# Patient Record
Sex: FEMALE
[De-identification: names, ages, dates, MRNs, and addresses within clinical notes are randomized per-mention and may not be internally consistent; named-entity substitution may affect disease eponyms.]

---

## 2020-04-27 ENCOUNTER — HOSPITAL ENCOUNTER (OUTPATIENT)
Dept: HOSPITAL 92 - L&D/OP | Age: 30
Discharge: HOME HEALTH | End: 2020-04-27
Attending: FAMILY MEDICINE
Payer: SELF-PAY

## 2020-04-27 VITALS — BODY MASS INDEX: 29.4 KG/M2

## 2020-04-27 DIAGNOSIS — N89.8: ICD-10-CM

## 2020-04-27 DIAGNOSIS — Z3A.38: ICD-10-CM

## 2020-04-27 DIAGNOSIS — O99.89: Primary | ICD-10-CM

## 2020-04-27 DIAGNOSIS — O24.415: ICD-10-CM

## 2020-04-27 DIAGNOSIS — Z79.84: ICD-10-CM

## 2020-04-27 LAB — AMNISURE INTERNAL CONTROL QC: (no result)

## 2020-04-27 PROCEDURE — 99284 EMERGENCY DEPT VISIT MOD MDM: CPT

## 2020-04-27 PROCEDURE — 84112 EVAL AMNIOTIC FLUID PROTEIN: CPT

## 2020-04-27 NOTE — PDOC.FPROB
FMR OB H&P: HPI





- History of Present Illness


Chief Complaint: LOF


Indentification: 28yo  @ 38wga


History of Present Illness: 





Patient is a 29F  @ 38wga with PMHx of A2GDM that presents due to possible 

LOF. 





Patient reports that around 8pm last night she began to feel fluid down her 

pants when she sat up from the chair. She reports it continued at night and 

this morning so she came in. She states the fluid was clear. She denies any 

vaginal bleeding, other vaginal discharge, cp, sob, n/v/d. She states she is 

feeling a contraction every 30min though mild. +FM. Patient reports sexual 

intercourse within the last 48hrs. 





PCP: E[facility]


Primary Care Physician: 





PCP: Ewa





FMR OB H&P: Current Pregnancy





- Prenatal Care


: 2


Para: 1


Gestational age: 38wga





- OB Labs


Blood type: O


RH: positive


Antibody Screen: negative


HIV: negative


RPR: negative


Rubella: immune


Gonorrhea: negative


Chlamydia: negative


1 hour gtt: 190


3 hour GTT: 177


A1c: 5.5


GBS: positive


H&H: 11.5/33.5


Platelets: 170





FMR OB H&P: History





- Past Medical History


PMH: 





none





- OB History


OB History: 





 2018, no complications during pregnancy or delivery; no hx of GDM





- GYN History


GYN History: 





Pap May 2019, reportedly wnl





- Surgical History


Sx History: 





none





- Social History


Social History: 





non-smoker, no alcohol use, no drug use





- Family History


Family History: 





No hx of family genetic conditions





FMR OB H&P: Medications





- Current


Home Medications: 


 











 Medication  Instructions  Recorded  Confirmed  Type


 


Prenatal Vitamin 1 tab PO DAILY  tab 18 Rx


 


metFORMIN [Glucophage] 500 mg PO QAM-WM 20 History











Allergies/Adverse Reactions: 


 Allergies











Allergy/AdvReac Type Severity Reaction Status Date / Time


 


No Known Allergies Allergy   Verified 20 11:25














FMR OB H&P: ROS





- Review of Systems


General: denies: fever/chills, weight/appetite/sleep changes


Eyes: denies: eye pain, vision changes


ENT: denies: nasal congestion, rhinorrhea


Cardiovascular: denies: chest pain, palpitation, edema


Respiratory: denies: cough, shortness of breath


Gastrointestinal: denies: abdominal pain, nausea, vomiting, diarrhea


Genitourinary (Female): reports: contractions (q30min), other (possible LOF).  

denies: vaginal bleeding


Musculoskeletal: denies: pain, stiffness


Neurologic: denies: syncope, seizures


Integumentary: denies: itching, rash


Breast: denies: masses, skin changes


Endocrine: denies: cold intolerance, heat intolerance


Hematologic/Lymphatic: denies: prolonged or excessive bleeding, enlarged lymph 

nodes


Psychological: denies: depression, anxiety





FMR OB H&P: Vital Signs





- Maternal


Vital signs: 





/60, p73, O2 Sat 98% on RA





- Fetal Heart Tones


Baseline: 145


Variability: moderate


Acceleration: present


Deceleration: absent


Category: category 1


[place] contractions every: 8-10min





FMR OB H&P: Physical Exam





- Physical Exam


General: NAD, awake, alert and oriented


HEENT: normocephalic and atraumatic, MMM


Neck: supple, FROM


Chest: non-tender to palpation, no lesions


Heart: RRR, normal S1/S2


General: CTAB, no respiratory distress


Abdomen: gravid, non-tender


Musculoskeletal: pulses present, FROM in all four extremities


Neurological: no clonus, no focal deficit


Skin: no rash, good tugor


Lymphatic: no unusual bruising or bleeding, no purpura


Psychiatric: intact recent and remote memory, good judgement and insight





- Pelvic Exam


Vulva: normal hair distribution, no blood


Cervix: no masses


SVE: 3/30/-3





FMR OB H&P: A/P





- Problem List


(1) Term pregnancy


Status: Acute   Code(s): Z34.80 - ENCOUNTER FOR SUPRVSN OF NORMAL PREGNANCY, 

UNSP TRIMESTER   





(2) Gestational diabetes mellitus (GDM) affecting pregnancy


Status: Acute   Code(s): O24.419 - GESTATIONAL DIABETES MELLITUS IN PREGNANCY, 

UNSP CONTROL   


Disposition: 





Patient is a 29F  @ 38wga with PMHx of A2GDM and GBS bactiuria that 

presents due to possible LOF.





#Term pregnancy, possible LOF


-38wga


-LOF reportedly started at 8pm 


-sterile spec exam: vaginal discharge, -valsalva


-amnisure negative


-U/S demonstrates JEANA of 11


-FHT: basleine 145, +accels, no decels, ctx q8-10min


- 3/30/-3 @ 1225, 3/-3 @ 1500


-Ferning was performed due to the vaginal discharge and incomplete certainty of 

pooling; ferning negative





#A2GDM


-patient reportedly started metformin last week, encourage to continue


-A1C 5.5





#Hx of GBS Bactiuria


-will require antibiotic prophylaxis during labor





Dispo: the patient will be discharged with close follow up with [facility]. -amnisure, 

-ferning, and reassuring US are c/w intact membranes. Patient was provided with 

RTC/ED precautions including continued fluid loss, any signs of fever, 

increasing contractions, or decreased fetal movement. Patient has a clinic 

appointment tomorrow and an US scheduled on Friday. Patient has scheduled 

induction on May 5th, 2020.





 


Discussion: 


Date/Time: 20 0619











This H&P was discussed with . [Ricardo Zazueta] and  [Collin Simpson] who 

agree with the above documentation and plan.





Signature: 





RASHARD Brown MD PGY-1





Addendum - Attending





- Attending Attestation


Date/Time: 20 2540





I personally evaluated the patient and discussed the management with the team.


I agree with the History, Examination, Assessment and Plan documented above 

with any addition or exceptions noted below.





SSE with some white discharge, neg pooling/valsalva on my exam


Negative ferning


Negative amnisure


AF reassuring





d/c with appropriate labor precautions. Return if fever or foul discharge but I 

feel ROM is extremely unlikely given the above.

## 2020-04-30 ENCOUNTER — HOSPITAL ENCOUNTER (INPATIENT)
Dept: HOSPITAL 92 - L&D/OP | Age: 30
LOS: 1 days | Discharge: HOME | End: 2020-05-01
Attending: FAMILY MEDICINE | Admitting: FAMILY MEDICINE
Payer: MEDICAID

## 2020-04-30 VITALS — BODY MASS INDEX: 27.3 KG/M2

## 2020-04-30 DIAGNOSIS — Z3A.38: ICD-10-CM

## 2020-04-30 DIAGNOSIS — Z87.440: ICD-10-CM

## 2020-04-30 LAB
HBSAG INDEX: 0.14 S/CO (ref 0–0.99)
HGB BLD-MCNC: 13.2 G/DL (ref 12–16)
MCH RBC QN AUTO: 29.7 PG (ref 27–31)
MCV RBC AUTO: 87.7 FL (ref 78–98)
PLATELET # BLD AUTO: 148 THOU/UL (ref 130–400)
RBC # BLD AUTO: 4.46 MILL/UL (ref 4.2–5.4)
SYPHILIS ANTIBODY INDEX: 0.03 S/CO
WBC # BLD AUTO: 14.4 THOU/UL (ref 4.8–10.8)

## 2020-04-30 PROCEDURE — 99285 EMERGENCY DEPT VISIT HI MDM: CPT

## 2020-04-30 PROCEDURE — 86901 BLOOD TYPING SEROLOGIC RH(D): CPT

## 2020-04-30 PROCEDURE — 85018 HEMOGLOBIN: CPT

## 2020-04-30 PROCEDURE — 0KQM0ZZ REPAIR PERINEUM MUSCLE, OPEN APPROACH: ICD-10-PCS | Performed by: FAMILY MEDICINE

## 2020-04-30 PROCEDURE — 86900 BLOOD TYPING SEROLOGIC ABO: CPT

## 2020-04-30 PROCEDURE — 86850 RBC ANTIBODY SCREEN: CPT

## 2020-04-30 PROCEDURE — 51702 INSERT TEMP BLADDER CATH: CPT

## 2020-04-30 PROCEDURE — 36415 COLL VENOUS BLD VENIPUNCTURE: CPT

## 2020-04-30 PROCEDURE — 87340 HEPATITIS B SURFACE AG IA: CPT

## 2020-04-30 PROCEDURE — 36416 COLLJ CAPILLARY BLOOD SPEC: CPT

## 2020-04-30 PROCEDURE — 85014 HEMATOCRIT: CPT

## 2020-04-30 PROCEDURE — 86780 TREPONEMA PALLIDUM: CPT

## 2020-04-30 PROCEDURE — 85027 COMPLETE CBC AUTOMATED: CPT

## 2020-04-30 RX ADMIN — DOCUSATE CALCIUM SCH MG: 240 CAPSULE, LIQUID FILLED ORAL at 21:03

## 2020-04-30 RX ADMIN — Medication PRN MLS: at 10:20

## 2020-04-30 RX ADMIN — Medication PRN MLS: at 14:26

## 2020-04-30 RX ADMIN — Medication PRN MLS: at 12:46

## 2020-04-30 NOTE — PDOC.LDPN
Labor & Delivery Progress Note





- Subjective


Subjective: comfortable





- Objective


Vital signs reviewed and normal: yes





- Assessment


(1) History of GBS (group B streptococcus) UTI, currently pregnant


Code(s): O09.899 - SUPERVISION OF OTHER HIGH RISK PREGNANCIES, UNSP TRIMESTER; 

Z87.440 - PERSONAL HISTORY OF URINARY (TRACT) INFECTIONS   Current Visit: Yes   

Status: Acute   





(2) Term pregnancy


Code(s): Z34.80 - ENCOUNTER FOR SUPRVSN OF NORMAL PREGNANCY, UNSP TRIMESTER   

Current Visit: No   Status: Acute   


-: 





Patient is a 29F  @ 38.2 wga with PMHx of A2GDM and GBS bacteruria in labor





#Term pregnancy in latent labor


-/-2 0530


- 100/0 @ 0930


-FHTs: basleine 140s, no acels or decels with minimal variability, cat 2 strip


- previously periods of good variability


- accel present on scalp stim


- expectant mgmt for now, plan for AROM at 1030 after adequate GBS ppx








#A2GDM


-accuchecks





#Hx of GBS Bacteruria


-Aware, will require antibiotic prophylaxis.











Addendum - Attending





- Attending Attestation


Date/Time: 20 0775





I personally evaluated the patient and discussed the management with Dr. Zazueta.


I agree with the History, Examination, Assessment and Plan documented above 

with any addition or exceptions noted below.





Feel likely sleep cycle as intermittent areas of moderate variability and +

accel with FSS.


Plan for AROM.


Anticipate .

## 2020-04-30 NOTE — PDOC.LDPN
Labor & Delivery Progress Note





- Subjective


Subjective: comfortable





- Assessment


(1) History of GBS (group B streptococcus) UTI, currently pregnant


Code(s): O09.899 - SUPERVISION OF OTHER HIGH RISK PREGNANCIES, UNSP TRIMESTER; 

Z87.440 - PERSONAL HISTORY OF URINARY (TRACT) INFECTIONS   Current Visit: Yes   

Status: Acute   





(2) Term pregnancy


Code(s): Z34.80 - ENCOUNTER FOR SUPRVSN OF NORMAL PREGNANCY, UNSP TRIMESTER   

Current Visit: No   Status: Acute   


-: 





Patient is a 29F  @ 38.2 wga with PMHx of A2GDM and GBS bacteruria in labor





#Term pregnancy in latent labor


-/-2 0530


- 8/100/0 @ 0930


- SROM 1000, clear fluid


- ant lip/100/0 @ 1045


-FHTs: basleine 140s, accels with scalp stim, early decels, intermittent late, 

cat 2 strip


-periods of good variability and accels





- cont expectant mgmt, recheck in 20-30 min





#A2GDM


-accuchecks





#Hx of GBS Bacteruria


-adequate ppx








Addendum - Attending





- Attending Attestation


Date/Time: 20 8499





I personally evaluated the patient and discussed the management with Dr. Zazueta.


I agree with the History, Examination, Assessment and Plan documented above 

with any addition or exceptions noted below.





Cat 2 strip resolved with position changes.

## 2020-04-30 NOTE — PDOC.FPROB
FMR OB H&P: HPI





- History of Present Illness


Chief Complaint: suspected SROM w/ contractions


Indentification: 


History of Present Illness: 





30YO  @ 38.3 WGA by LMP c/w 10.2 week sono w/ A2GDM who presented to L&D 

with a CC of regular painful contractions that began @ ~0200. She finally 

decided to come in because she thought her water broke and the contractions 

were becoming increasingly more painful. She endorses fetal movement and 

increased mucoid d/c but denies any vaginal bleeding. Took her metformin last 

night but not yet this morning. 


Primary Care Physician: 





[facility]- [facility]/ 





FMR OB H&P: Current Pregnancy





- Prenatal Care


: 2


Para: 1001


Gestational age: 38.3


Due date: 20


Dating Criteria: LMP c/w 10.2 week sono


Course/Complications: 





A2GDM, GBS bacteruria s/p tx, h/o BV s/p tx





- OB Labs


Blood type: O


RH: positive


Antibody Screen: negative


HIV: negative


RPR: negative


HepBsAg: negative


Rubella: immune


Gonorrhea: negative


Chlamydia: negative


Pap Smear: done in 2019 @ [facility]


1 hour gtt: 167


A1c: 5.5


GBS: positive (GBS bacteruria this pregnancy)


Additional labs: 





Accucheck 89 on presentation





- Additional Ultrasound


Additional: 





3/17/20 [place] 43.1%





FMR OB H&P: History





- Past Medical History


PMH: 





None





- OB History


OB History: 





pregnancy #1 term 





- GYN History


GYN History: 





Last pap in 2019 & WNLs. No h/o STIs.





- Surgical History


Sx History: 





None





- Social History


Social History: 





No TAD.





- Family History


Family History: 





non-contributory





FMR OB H&P: Medications





- Current


Home Medications: 


 











 Medication  Instructions  Recorded  Confirmed  Type


 


Prenatal Vitamin 1 tab PO DAILY  tab 18 Rx


 


metFORMIN [Glucophage] 500 mg PO QAM-WM 20 History











Allergies/Adverse Reactions: 


 Allergies











Allergy/AdvReac Type Severity Reaction Status Date / Time


 


No Known Allergies Allergy   Verified 20 05:36














FMR OB H&P: ROS





- Review of Systems


General: denies: fever/chills


Eyes: denies: vision changes, double vision


ENT: denies: nasal congestion, sore throat


Cardiovascular: denies: chest pain, edema


Respiratory: denies: cough, shortness of breath


Gastrointestinal: denies: nausea, vomiting


Genitourinary (Female): reports: vaginal discharge, contractions.  denies: 

dysuria, hematuria, vaginal bleeding


Musculoskeletal: denies: pain, swelling


Neurologic: denies: syncope, headache


Integumentary: denies: itching, rash





FMR OB H&P: Vital Signs





- Maternal


Vital signs: 


 Vital Signs - First Documented











Temp Pulse Resp BP Pulse Ox


 


 98.6 F   68   12   113/60   98 


 


 20 05:27  20 05:27  20 05:27  20 05:27  20 05:27














- Fetal Heart Tones


Baseline: 140


Variability: minimal


Acceleration: absent


Deceleration: absent


Category: category 2


[place] contractions every: 3-5 minutes





FMR OB H&P: Physical Exam





- Physical Exam


General: awake, alert and oriented, other (mild distress 2/2 painful 

contractions)


HEENT: normocephalic and atraumatic, grossly normal vision, grossly normal 

hearing


Neck: supple, FROM


Heart: RRR, normal S1/S2, no murmurs/rubs/gallops


General: CTAB, no respiratory distress, good air movement, no rales/rhonchi, no 

wheezing, no retractions


Abdomen: gravid


Musculoskeletal: normal gait and station, FROM in all four extremities


Neurological: cranial nerves II through XII intact, sensation to pain,touch and 

proprioception grossly normal, no focal deficit


Skin: no rash


Lymphatic: no unusual bruising or bleeding


Psychiatric: intact recent and remote memory, good judgement and insight, 

normal mood and affect





FMR OB H&P: Results





- Labs


Lab results: 





Accucheck 86





FMR OB H&P: A/P





- Problem List


(1) History of GBS (group B streptococcus) UTI, currently pregnant


Current Visit: Yes   Status: Acute   Code(s): O09.899 - SUPERVISION OF OTHER 

HIGH RISK PREGNANCIES, UNSP TRIMESTER; Z87.440 - PERSONAL HISTORY OF URINARY (

TRACT) INFECTIONS   





(2) Gestational diabetes mellitus (GDM) affecting pregnancy


Current Visit: No   Status: Acute   Code(s): O24.419 - GESTATIONAL DIABETES 

MELLITUS IN PREGNANCY, UNSP CONTROL   





(3) Term pregnancy


Current Visit: No   Status: Acute   Code(s): Z34.80 - ENCOUNTER FOR SUPRVSN OF 

NORMAL PREGNANCY, UNSP TRIMESTER   


Disposition: 





Patient is a 29F  @ 38.2 wga with PMHx of A2GDM and GBS bacteruria that 

presents due to regular painful contractions & suspected SROM.





#Term pregnancy in latent labor


-/-2 with bulging bag on presentation w/ regular painful contractions q3-5 

minutes.


-FHTs: basleine 140s, no acels or decels with minimal variability, cat 2 strip


- Will admit to L&D for labor management and administer an LR bolus & give 

juice 2/2 cat 2 strip. Will continue to monitor closely.


- Patient desires an epidural, anesthesia consult placed.





#A2GDM


- On metformin which was started last week. Accucheck on presentation 86. Will 

continue Q2HR accuchecks & SSI PRN.





#Hx of GBS Bacteruria


-Aware, will require antibiotic prophylaxis.





Dispo: Will admit to L&D for labor management due to imminent SROM and regular, 

painful contractions.


Discussion: 


Date/Time: 20 0540











This H&P was discussed with Dr. Jean who agrees with the above documentation 

and plan.








Addendum - Attending





- Attending Attestation


Date/Time: 20 0002





I discussed the assessment and management with Dr. Ortiz this morning at time 

of admission.


I agree with the History, Examination, Assessment and Plan documented above 

with any addition or exceptions noted below.

## 2020-04-30 NOTE — PDOC.OPDEL
OB Operative/Delivery Note





- Additional Findings/Plan


Compilations/Other Findings: 





Vaginal Delivery Dictation Guideline 


Delivering Physician: Dr. Harlan Dubose, Dr. Jeannine Brown


Attending: Dr. Collin Simpson


Procedure: Spontaneous Vaginal Delivery 


Anesthesia: epidural


EBL: 200 ml 


Pre-op Diagnosis: 


1. Term intrauterine pregnancy in labor 


2. Hx of A2GDM


3. Hx of delivery requiring midline episiotomy and vacuum


4. Hx of GBS Bacteriuria 


Post-op Diagnosis: 


1. Term intrauterine pregnancy, delivered 


2-4. same as above 


Indications: A 30y/o female  presents in latent labor 


Delivery Note: This is 30yo F  @ 38.3wks who delivered a viable F infant 

at 1241 on 20. Following an uneventful antepartum course, a vigorous 

female was delivered over an intact perineum in the occipitoanterior position. 

Anterior Shoulder and then remainder of the body delivered. Nuchal cord x1. The 

head was held down and mouth and nares were bulb suctioned. Cord clamped and 

cut and cord blood collected. Placenta delivered intact in the [place] 

presentation with tailing products of conception, though no membranes or 

retained products palpated on physical exam. A 3 vessel cord noted. Fundal 

massage was performed and the fundus was firm. The cervix and vagina were 

inspected and 2nd degree laceration noted and repaired with 3-0 vicryl in the 

usual fashion with good approximation and hemostasis. Infant went to  

nursery in good condition for routine care. Apgars were 8/9 at 1 & 5 minutes, 

respectively. Patient tolerated delivery well and went to postpartum after 

routine recovery/care. 





Addendum - Attending





- Attending Attestation


Date/Time: 20 1520





I was present for the entire delivery.

## 2020-05-01 VITALS — DIASTOLIC BLOOD PRESSURE: 51 MMHG | SYSTOLIC BLOOD PRESSURE: 113 MMHG | TEMPERATURE: 99 F

## 2020-05-01 LAB — HGB BLD-MCNC: 10.4 G/DL (ref 12–16)

## 2020-05-01 RX ADMIN — DOCUSATE CALCIUM SCH MG: 240 CAPSULE, LIQUID FILLED ORAL at 09:08

## 2020-05-01 NOTE — PDOC.OBPPN
FMR OB Postpartum PN: Subj





- Interval History


Hospital Day: 2


Postpartum Day: 





1


Chief Complaint: none, mild pain, mild lochia


Indentification: 30yo  delivered F @ 38.2 via  on  @ 1241


Interval History: mild pain, mild lochia, ambulating, tolerating PO, voiding, 

no BM/flatus





FMR OB Postpartum PN: Obj





- Maternal


Vital signs: 


BP: [95-98/52-54]  HR: [66-69] RR: [18] Tmax: [98.3F] Pox: [98]% on [RA]  Wt: [

59.421kg]   








- Urine output


I&O: 


 











 20





 06:59 06:59 06:59


 


Output Total   259


 


Balance   -259














- Lochia


Lochia: 





mild





- Pain Management


Intervention: oral medication





FMR OB Postpartum PN: Exam





- Physical Exam


General: NAD, awake, alert and oriented


HEENT: normocephalic and atraumatic, MMM


Neck: supple, FROM


Chest: non-tender to palpation, no lesions


Heart: RRR, normal S1/S2


General: CTAB, no respiratory distress


Abdomen: soft, non-tender, bowel sound present


Musculoskeletal: pulses present, FROM in all four extremities


Neurological: no tremor, no focal deficit


Skin: no rash, good tugor


Postpartum: no edema, appropriately tender


Lymphatic: no unusual bruising or bleeding, no purpura


Psychiatric: intact recent and remote memory, good judgement and insight





FMR OB Postpartum PN: Data





- Labs


Lab results: 


 Laboratory Results - last 24 hr











  20





  05:51 06:23 06:23


 


WBC   


 


RBC   


 


Hgb   


 


Hct   


 


MCV   


 


MCH   


 


MCHC   


 


RDW   


 


Plt Count   


 


MPV   


 


POC Glucose  89  


 


Syphilis IgG/IgM Ab    Nonreactive


 


Hep Bs Antigen   Non-Reactive 


 


Blood Type   


 


Antibody Screen   














  20





  06:23 06:23 07:39


 


WBC   14.4 H 


 


RBC   4.46 


 


Hgb   13.2 


 


Hct   39.1 


 


MCV   87.7 


 


MCH   29.7 


 


MCHC   33.9 


 


RDW   12.3 


 


Plt Count   148 


 


MPV   9.6 


 


POC Glucose    111 H


 


Syphilis IgG/IgM Ab   


 


Hep Bs Antigen   


 


Blood Type  O POSITIVE  


 


Antibody Screen  NEGATIVE  














  20





  08:48 05:05


 


WBC  


 


RBC  


 


Hgb   10.4 L


 


Hct   31.6 L


 


MCV  


 


MCH  


 


MCHC  


 


RDW  


 


Plt Count  


 


MPV  


 


POC Glucose  82 


 


Syphilis IgG/IgM Ab  


 


Hep Bs Antigen  


 


Blood Type  


 


Antibody Screen  














FMR OB Postpartum PN: A/P





- Problem List


(1) Postpartum care and examination


Current Visit: Yes   Status: Acute   Code(s): Z39.2 - ENCOUNTER FOR ROUTINE 

POSTPARTUM FOLLOW-UP   





(2) Gestational diabetes mellitus (GDM) affecting pregnancy


Current Visit: No   Status: Resolved   Code(s): O24.419 - GESTATIONAL DIABETES 

MELLITUS IN PREGNANCY, UNSP CONTROL   


Disposition: 





Patient is a 30yo >P1 that delivered a viable M via  @ 38.2wga on  @ 

1241.





#Post-partum Day 1


-lochia mild


-pain mild, well controlled


-patient is passing gas, no BM yet


-voiding appropriately


-ambulating well


-tolerating PO


-pp contraception: uncertain, though she is interested; discussed options and 

patient will f/u at [facility]


-f/u: [facility] in 2 weeks; baby to f/u with Dr. Burnette





#A2GDM


-glucose controlled throughout labor


-discontinued metformin


-repeat glucose testing at 6 weeks post-partum





#Hx of GBS bacteriuria


-adequately treated with penicillin





Diet: Regular


Dispo: doing well, ambulating/tolerating PO/voiding. Possible discharge later 

today pending baby's bili 


Discussion: 


Date/Time: 20 0587











Signature: 





Jeannine Brown MD- PGY-1





Addendum - Attending





- Attending Attestation


Date/Time: 20 7043





I personally evaluated the patient and discussed the management with Dr. Brown.


I agree with the History, Examination, Assessment and Plan documented above 

with any addition or exceptions noted below.

## 2023-03-16 ENCOUNTER — HOSPITAL ENCOUNTER (OUTPATIENT)
Dept: HOSPITAL 92 - CSHLD/OP | Age: 33
Discharge: HOME HEALTH | End: 2023-03-16
Attending: STUDENT IN AN ORGANIZED HEALTH CARE EDUCATION/TRAINING PROGRAM
Payer: COMMERCIAL

## 2023-03-16 DIAGNOSIS — O47.03: ICD-10-CM

## 2023-03-16 DIAGNOSIS — O24.419: ICD-10-CM

## 2023-03-16 DIAGNOSIS — Z79.899: ICD-10-CM

## 2023-03-16 DIAGNOSIS — Z03.71: Primary | ICD-10-CM

## 2023-03-16 DIAGNOSIS — Z3A.36: ICD-10-CM

## 2023-03-16 DIAGNOSIS — Z79.84: ICD-10-CM

## 2023-03-16 PROCEDURE — 99284 EMERGENCY DEPT VISIT MOD MDM: CPT

## 2023-03-16 PROCEDURE — 84112 EVAL AMNIOTIC FLUID PROTEIN: CPT

## 2023-03-16 PROCEDURE — 87653 STREP B DNA AMP PROBE: CPT

## 2023-03-23 ENCOUNTER — HOSPITAL ENCOUNTER (INPATIENT)
Dept: HOSPITAL 92 - CSHLD | Age: 33
LOS: 2 days | Discharge: HOME | End: 2023-03-25
Attending: FAMILY MEDICINE | Admitting: FAMILY MEDICINE
Payer: COMMERCIAL

## 2023-03-23 VITALS — BODY MASS INDEX: 29 KG/M2

## 2023-03-23 DIAGNOSIS — E11.9: ICD-10-CM

## 2023-03-23 DIAGNOSIS — Z79.84: ICD-10-CM

## 2023-03-23 DIAGNOSIS — Z3A.37: ICD-10-CM

## 2023-03-23 DIAGNOSIS — Z79.82: ICD-10-CM

## 2023-03-23 DIAGNOSIS — O36.5930: Primary | ICD-10-CM

## 2023-03-23 LAB
GLUCOSE SERPL-MCNC: 85 MG/DL (ref 70–105)
HBSAG INDEX: 0.15 S/CO (ref 0–0.99)
HGB BLD-MCNC: 11.7 G/DL (ref 12–15.5)
MCH RBC QN AUTO: 28.5 PG (ref 27–33)
MCV RBC AUTO: 85.4 FL (ref 81.6–98.3)
PLATELET # BLD AUTO: 143 10X3/UL (ref 150–450)
RBC # BLD AUTO: 4.1 10X6/UL (ref 3.9–5.03)
SYPHILIS ANTIBODY INDEX: 0.03 S/CO
WBC # BLD AUTO: 5.9 10X3/UL (ref 3.5–10.5)

## 2023-03-23 PROCEDURE — 87340 HEPATITIS B SURFACE AG IA: CPT

## 2023-03-23 PROCEDURE — 36416 COLLJ CAPILLARY BLOOD SPEC: CPT

## 2023-03-23 PROCEDURE — 86780 TREPONEMA PALLIDUM: CPT

## 2023-03-23 PROCEDURE — 86901 BLOOD TYPING SEROLOGIC RH(D): CPT

## 2023-03-23 PROCEDURE — 86850 RBC ANTIBODY SCREEN: CPT

## 2023-03-23 PROCEDURE — 82947 ASSAY GLUCOSE BLOOD QUANT: CPT

## 2023-03-23 PROCEDURE — 85027 COMPLETE CBC AUTOMATED: CPT

## 2023-03-23 PROCEDURE — 86900 BLOOD TYPING SEROLOGIC ABO: CPT

## 2023-03-23 PROCEDURE — 3E0P7VZ INTRODUCTION OF HORMONE INTO FEMALE REPRODUCTIVE, VIA NATURAL OR ARTIFICIAL OPENING: ICD-10-PCS | Performed by: FAMILY MEDICINE

## 2023-03-23 PROCEDURE — 51702 INSERT TEMP BLADDER CATH: CPT

## 2023-03-25 VITALS — TEMPERATURE: 98.2 F | DIASTOLIC BLOOD PRESSURE: 56 MMHG | SYSTOLIC BLOOD PRESSURE: 118 MMHG
